# Patient Record
Sex: FEMALE | Race: WHITE
[De-identification: names, ages, dates, MRNs, and addresses within clinical notes are randomized per-mention and may not be internally consistent; named-entity substitution may affect disease eponyms.]

---

## 2020-06-28 ENCOUNTER — HOSPITAL ENCOUNTER (INPATIENT)
Dept: HOSPITAL 12 - ER | Age: 75
LOS: 4 days | Discharge: NURSING, REHAB OR LONG-TERM CARE | DRG: 388 | End: 2020-07-02
Attending: INTERNAL MEDICINE
Payer: MEDICARE

## 2020-06-28 VITALS — WEIGHT: 248.56 LBS | BODY MASS INDEX: 39.01 KG/M2 | HEIGHT: 67 IN

## 2020-06-28 DIAGNOSIS — N32.81: ICD-10-CM

## 2020-06-28 DIAGNOSIS — D64.9: ICD-10-CM

## 2020-06-28 DIAGNOSIS — K80.20: ICD-10-CM
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
## 2020-06-28 DIAGNOSIS — N18.9: ICD-10-CM

## 2020-06-28 DIAGNOSIS — K52.9: ICD-10-CM

## 2020-06-28 DIAGNOSIS — J90: ICD-10-CM

## 2020-06-28 DIAGNOSIS — R74.0: ICD-10-CM

## 2020-06-28 DIAGNOSIS — K57.30: ICD-10-CM

## 2020-06-28 DIAGNOSIS — E11.9: ICD-10-CM

## 2020-06-28 DIAGNOSIS — K56.41: Primary | ICD-10-CM

## 2020-06-28 DIAGNOSIS — E66.9: ICD-10-CM

## 2020-06-28 DIAGNOSIS — I48.91: ICD-10-CM

## 2020-06-28 DIAGNOSIS — I95.9: ICD-10-CM

## 2020-06-28 DIAGNOSIS — K52.89: ICD-10-CM

## 2020-06-28 DIAGNOSIS — Z87.891: ICD-10-CM

## 2020-06-28 DIAGNOSIS — T40.2X5A: ICD-10-CM

## 2020-06-28 DIAGNOSIS — R60.9: ICD-10-CM

## 2020-06-28 DIAGNOSIS — Z88.0: ICD-10-CM

## 2020-06-28 DIAGNOSIS — N17.0: ICD-10-CM

## 2020-06-28 DIAGNOSIS — Z96.641: ICD-10-CM

## 2020-06-28 DIAGNOSIS — D68.69: ICD-10-CM

## 2020-06-28 DIAGNOSIS — E86.1: ICD-10-CM

## 2020-06-28 DIAGNOSIS — E43: ICD-10-CM

## 2020-06-28 DIAGNOSIS — I49.3: ICD-10-CM

## 2020-06-28 DIAGNOSIS — E88.09: ICD-10-CM

## 2020-06-28 DIAGNOSIS — I12.9: ICD-10-CM

## 2020-06-28 DIAGNOSIS — E87.1: ICD-10-CM

## 2020-06-28 DIAGNOSIS — N28.1: ICD-10-CM

## 2020-06-28 LAB
BASOPHILS # BLD AUTO: 0 K/UL (ref 0–8)
BASOPHILS NFR BLD AUTO: 0.1 % (ref 0–2)
EOSINOPHIL # BLD AUTO: 0 K/UL (ref 0–0.7)
EOSINOPHIL NFR BLD AUTO: 0.1 % (ref 0–7)
HCT VFR BLD AUTO: 29.5 % (ref 31.2–41.9)
HGB BLD-MCNC: 10.1 G/DL (ref 10.9–14.3)
LYMPHOCYTES # BLD AUTO: 1.2 K/UL (ref 20–40)
LYMPHOCYTES NFR BLD AUTO: 10.8 % (ref 20.5–51.5)
MCH RBC QN AUTO: 30.5 UUG (ref 24.7–32.8)
MCHC RBC AUTO-ENTMCNC: 34 G/DL (ref 32.3–35.6)
MCV RBC AUTO: 88.5 FL (ref 75.5–95.3)
MONOCYTES # BLD AUTO: 1.1 K/UL (ref 2–10)
MONOCYTES NFR BLD AUTO: 10.2 % (ref 0–11)
NEUTROPHILS # BLD AUTO: 8.8 K/UL (ref 1.8–8.9)
NEUTROPHILS NFR BLD AUTO: 78.8 % (ref 38.5–71.5)
PLATELET # BLD AUTO: 261 K/UL (ref 179–408)
RBC # BLD AUTO: 3.33 MIL/UL (ref 3.63–4.92)
WBC # BLD AUTO: 11.1 K/UL (ref 3.8–11.8)

## 2020-06-28 PROCEDURE — C9113 INJ PANTOPRAZOLE SODIUM, VIA: HCPCS

## 2020-06-28 PROCEDURE — G0378 HOSPITAL OBSERVATION PER HR: HCPCS

## 2020-06-29 VITALS — DIASTOLIC BLOOD PRESSURE: 50 MMHG | SYSTOLIC BLOOD PRESSURE: 100 MMHG

## 2020-06-29 VITALS — SYSTOLIC BLOOD PRESSURE: 98 MMHG | DIASTOLIC BLOOD PRESSURE: 48 MMHG

## 2020-06-29 VITALS — SYSTOLIC BLOOD PRESSURE: 94 MMHG | DIASTOLIC BLOOD PRESSURE: 48 MMHG

## 2020-06-29 VITALS — DIASTOLIC BLOOD PRESSURE: 53 MMHG | SYSTOLIC BLOOD PRESSURE: 104 MMHG

## 2020-06-29 LAB
ALP SERPL-CCNC: 73 U/L (ref 50–136)
ALP SERPL-CCNC: 84 U/L (ref 50–136)
ALT SERPL W/O P-5'-P-CCNC: 27 U/L (ref 14–59)
ALT SERPL W/O P-5'-P-CCNC: 35 U/L (ref 14–59)
APPEARANCE UR: CLEAR
APPEARANCE UR: CLEAR
AST SERPL-CCNC: 29 U/L (ref 15–37)
AST SERPL-CCNC: 35 U/L (ref 15–37)
BASOPHILS # BLD AUTO: 0 K/UL (ref 0–8)
BASOPHILS NFR BLD AUTO: 0.1 % (ref 0–2)
BILIRUB DIRECT SERPL-MCNC: 0.3 MG/DL (ref 0–0.2)
BILIRUB SERPL-MCNC: 0.6 MG/DL (ref 0.2–1)
BILIRUB SERPL-MCNC: 0.9 MG/DL (ref 0.2–1)
BILIRUB UR QL STRIP: (no result)
BILIRUB UR QL STRIP: (no result)
BUN SERPL-MCNC: 40 MG/DL (ref 7–18)
BUN SERPL-MCNC: 52 MG/DL (ref 7–18)
CHLORIDE SERPL-SCNC: 100 MMOL/L (ref 98–107)
CHLORIDE SERPL-SCNC: 104 MMOL/L (ref 98–107)
CO2 SERPL-SCNC: 23 MMOL/L (ref 21–32)
CO2 SERPL-SCNC: 25 MMOL/L (ref 21–32)
COLOR UR: (no result)
CREAT SERPL-MCNC: 1 MG/DL (ref 0.6–1.3)
CREAT SERPL-MCNC: 1.7 MG/DL (ref 0.6–1.3)
CREAT UR-MCNC: 109.8 MG/DL (ref 30–125)
DEPRECATED SQUAMOUS URNS QL MICRO: (no result) /HPF
DEPRECATED SQUAMOUS URNS QL MICRO: (no result) /HPF
EOSINOPHIL # BLD AUTO: 0 K/UL (ref 0–0.7)
EOSINOPHIL NFR BLD AUTO: 0.1 % (ref 0–7)
GLUCOSE SERPL-MCNC: 124 MG/DL (ref 74–106)
GLUCOSE SERPL-MCNC: 125 MG/DL (ref 74–106)
GLUCOSE UR STRIP-MCNC: NEGATIVE MG/DL
GLUCOSE UR STRIP-MCNC: NEGATIVE MG/DL
HCT VFR BLD AUTO: 26.1 % (ref 31.2–41.9)
HGB BLD-MCNC: 8.9 G/DL (ref 10.9–14.3)
HGB UR QL STRIP: (no result)
HGB UR QL STRIP: (no result)
KETONES UR STRIP-MCNC: NEGATIVE MG/DL
KETONES UR STRIP-MCNC: NEGATIVE MG/DL
LEUKOCYTE ESTERASE UR QL STRIP: NEGATIVE
LEUKOCYTE ESTERASE UR QL STRIP: NEGATIVE
LIPASE SERPL-CCNC: 31 U/L (ref 73–393)
LYMPHOCYTES # BLD AUTO: 0.7 K/UL (ref 20–40)
LYMPHOCYTES NFR BLD AUTO: 8.7 % (ref 20.5–51.5)
MCH RBC QN AUTO: 30.3 UUG (ref 24.7–32.8)
MCHC RBC AUTO-ENTMCNC: 34 G/DL (ref 32.3–35.6)
MCV RBC AUTO: 88.6 FL (ref 75.5–95.3)
MONOCYTES # BLD AUTO: 1.1 K/UL (ref 2–10)
MONOCYTES NFR BLD AUTO: 13.4 % (ref 0–11)
NEUTROPHILS # BLD AUTO: 6.5 K/UL (ref 1.8–8.9)
NEUTROPHILS NFR BLD AUTO: 77.7 % (ref 38.5–71.5)
NITRITE UR QL STRIP: NEGATIVE
NITRITE UR QL STRIP: NEGATIVE
PH UR STRIP: 5.5 [PH] (ref 5–8)
PH UR STRIP: 6 [PH] (ref 5–8)
PLATELET # BLD AUTO: 247 K/UL (ref 179–408)
POTASSIUM SERPL-SCNC: 3 MMOL/L (ref 3.5–5.1)
POTASSIUM SERPL-SCNC: 3.6 MMOL/L (ref 3.5–5.1)
PROT UR-MCNC: 83.2 MG/DL
RBC # BLD AUTO: 2.95 MIL/UL (ref 3.63–4.92)
RBC #/AREA URNS HPF: (no result) /HPF (ref 0–3)
RBC #/AREA URNS HPF: (no result) /HPF (ref 0–3)
SP GR UR STRIP: 1.02 (ref 1–1.03)
SP GR UR STRIP: 1.02 (ref 1–1.03)
UROBILINOGEN UR STRIP-MCNC: 1 E.U./DL
UROBILINOGEN UR STRIP-MCNC: 1 E.U./DL
WBC # BLD AUTO: 8.4 K/UL (ref 3.8–11.8)
WBC #/AREA URNS HPF: (no result) /HPF
WBC #/AREA URNS HPF: (no result) /HPF (ref 0–3)
WBC #/AREA URNS HPF: (no result) /HPF (ref 0–3)
WS STN SPEC: 5 G/DL (ref 6.4–8.2)
WS STN SPEC: 5.8 G/DL (ref 6.4–8.2)

## 2020-06-29 RX ADMIN — DEXTROSE SCH MLS/HR: 50 INJECTION, SOLUTION INTRAVENOUS at 21:48

## 2020-06-29 RX ADMIN — DOCUSATE SODIUM SCH MG: 100 CAPSULE, LIQUID FILLED ORAL at 14:26

## 2020-06-29 RX ADMIN — ATORVASTATIN CALCIUM SCH MG: 40 TABLET, FILM COATED ORAL at 20:39

## 2020-06-29 RX ADMIN — DEXTROSE SCH MG: 50 INJECTION, SOLUTION INTRAVENOUS at 08:51

## 2020-06-29 RX ADMIN — ENOXAPARIN SODIUM SCH MG: 30 INJECTION SUBCUTANEOUS at 08:51

## 2020-06-29 RX ADMIN — DEXTROSE SCH MLS/HR: 50 INJECTION, SOLUTION INTRAVENOUS at 17:50

## 2020-06-29 RX ADMIN — DOCUSATE SODIUM SCH MG: 100 CAPSULE, LIQUID FILLED ORAL at 20:39

## 2020-06-29 RX ADMIN — SODIUM CHLORIDE PRN MLS/HR: 0.9 INJECTION, SOLUTION INTRAVENOUS at 14:00

## 2020-06-29 RX ADMIN — Medication SCH MG: at 14:26

## 2020-06-29 RX ADMIN — DEXTROSE SCH MLS/HR: 50 INJECTION, SOLUTION INTRAVENOUS at 20:39

## 2020-06-29 RX ADMIN — DEXTROSE SCH MLS/HR: 50 INJECTION, SOLUTION INTRAVENOUS at 15:31

## 2020-06-29 RX ADMIN — DEXTROSE SCH MLS/HR: 50 INJECTION, SOLUTION INTRAVENOUS at 16:39

## 2020-06-29 RX ADMIN — DEXTROSE SCH MLS/HR: 50 INJECTION, SOLUTION INTRAVENOUS at 14:24

## 2020-06-29 RX ADMIN — Medication PRN MG: at 05:15

## 2020-06-30 VITALS — SYSTOLIC BLOOD PRESSURE: 103 MMHG | DIASTOLIC BLOOD PRESSURE: 47 MMHG

## 2020-06-30 VITALS — SYSTOLIC BLOOD PRESSURE: 104 MMHG | DIASTOLIC BLOOD PRESSURE: 49 MMHG

## 2020-06-30 VITALS — SYSTOLIC BLOOD PRESSURE: 122 MMHG | DIASTOLIC BLOOD PRESSURE: 62 MMHG

## 2020-06-30 VITALS — DIASTOLIC BLOOD PRESSURE: 48 MMHG | SYSTOLIC BLOOD PRESSURE: 104 MMHG

## 2020-06-30 VITALS — SYSTOLIC BLOOD PRESSURE: 100 MMHG | DIASTOLIC BLOOD PRESSURE: 42 MMHG

## 2020-06-30 LAB
ALP SERPL-CCNC: 88 U/L (ref 50–136)
ALT SERPL W/O P-5'-P-CCNC: 35 U/L (ref 14–59)
AST SERPL-CCNC: 38 U/L (ref 15–37)
BASOPHILS # BLD AUTO: 0 K/UL (ref 0–8)
BASOPHILS NFR BLD AUTO: 0.1 % (ref 0–2)
BASOPHILS NFR BLD MANUAL: 1 % (ref 0–2)
BILIRUB SERPL-MCNC: 0.5 MG/DL (ref 0.2–1)
BUN SERPL-MCNC: 28 MG/DL (ref 7–18)
CHLORIDE SERPL-SCNC: 107 MMOL/L (ref 98–107)
CHOLEST SERPL-MCNC: 73 MG/DL (ref ?–200)
CK SERPL-CCNC: 55 U/L (ref 26–192)
CO2 SERPL-SCNC: 25 MMOL/L (ref 21–32)
CREAT SERPL-MCNC: 0.8 MG/DL (ref 0.6–1.3)
EOSINOPHIL # BLD AUTO: 0.1 K/UL (ref 0–0.7)
EOSINOPHIL NFR BLD AUTO: 0.6 % (ref 0–7)
EOSINOPHIL NFR BLD MANUAL: 1 % (ref 0–8)
EOSINOPHIL NFR BLD MANUAL: 1 % (ref 0–8)
GLUCOSE SERPL-MCNC: 108 MG/DL (ref 74–106)
HCT VFR BLD AUTO: 23.8 % (ref 31.2–41.9)
HDLC SERPL-MCNC: < 10 MG/DL (ref 40–60)
HGB BLD-MCNC: 8.3 G/DL (ref 10.9–14.3)
LYMPHOCYTES # BLD AUTO: 1.1 K/UL (ref 20–40)
LYMPHOCYTES NFR BLD AUTO: 10.2 % (ref 20.5–51.5)
LYMPHOCYTES NFR BLD MANUAL: 18 % (ref 20–40)
LYMPHOCYTES NFR BLD MANUAL: 9 % (ref 20–40)
MAGNESIUM SERPL-MCNC: 2.5 MG/DL (ref 1.8–2.4)
MCH RBC QN AUTO: 30.5 UUG (ref 24.7–32.8)
MCHC RBC AUTO-ENTMCNC: 35 G/DL (ref 32.3–35.6)
MCV RBC AUTO: 87.8 FL (ref 75.5–95.3)
METAMYELOCYTES NFR BLD MANUAL: 1 % (ref 0–1)
METAMYELOCYTES NFR BLD MANUAL: 2 % (ref 0–1)
MONOCYTES # BLD AUTO: 1.2 K/UL (ref 2–10)
MONOCYTES NFR BLD AUTO: 11.4 % (ref 0–11)
MONOCYTES NFR BLD MANUAL: 18 % (ref 2–10)
MONOCYTES NFR BLD MANUAL: 7 % (ref 2–10)
NEUTROPHILS # BLD AUTO: 8.2 K/UL (ref 1.8–8.9)
NEUTROPHILS NFR BLD AUTO: 77.7 % (ref 38.5–71.5)
NEUTS BAND NFR BLD MANUAL: 21 % (ref 0–10)
NEUTS BAND NFR BLD MANUAL: 8 % (ref 0–10)
NEUTS SEG NFR BLD MANUAL: 49 % (ref 42–75)
NEUTS SEG NFR BLD MANUAL: 64 % (ref 42–75)
PHOSPHATE SERPL-MCNC: 1.7 MG/DL (ref 2.5–4.9)
PLATELET # BLD AUTO: 243 K/UL (ref 179–408)
POTASSIUM SERPL-SCNC: 3.4 MMOL/L (ref 3.5–5.1)
RBC # BLD AUTO: 2.71 MIL/UL (ref 3.63–4.92)
TRIGL SERPL-MCNC: 151 MG/DL (ref 30–150)
TSH SERPL DL<=0.005 MIU/L-ACNC: 1.49 MIU/ML (ref 0.36–3.74)
WBC # BLD AUTO: 10.6 K/UL (ref 3.8–11.8)
WS STN SPEC: 4.7 G/DL (ref 6.4–8.2)

## 2020-06-30 RX ADMIN — DEXTROSE SCH MLS/HR: 50 INJECTION, SOLUTION INTRAVENOUS at 10:58

## 2020-06-30 RX ADMIN — SODIUM CHLORIDE PRN MLS/HR: 0.9 INJECTION, SOLUTION INTRAVENOUS at 12:11

## 2020-06-30 RX ADMIN — Medication PRN MG: at 04:56

## 2020-06-30 RX ADMIN — BISACODYL SCH MG: 10 SUPPOSITORY RECTAL at 20:23

## 2020-06-30 RX ADMIN — Medication SCH MG: at 20:23

## 2020-06-30 RX ADMIN — Medication SCH MG: at 08:34

## 2020-06-30 RX ADMIN — DOCUSATE SODIUM SCH MG: 100 CAPSULE, LIQUID FILLED ORAL at 20:23

## 2020-06-30 RX ADMIN — Medication PRN MG: at 00:34

## 2020-06-30 RX ADMIN — DEXTROSE SCH MLS/HR: 50 INJECTION, SOLUTION INTRAVENOUS at 12:11

## 2020-06-30 RX ADMIN — ENOXAPARIN SODIUM SCH MG: 30 INJECTION SUBCUTANEOUS at 08:38

## 2020-06-30 RX ADMIN — METOCLOPRAMIDE SCH MG: 5 INJECTION, SOLUTION INTRAMUSCULAR; INTRAVENOUS at 20:25

## 2020-06-30 RX ADMIN — ATORVASTATIN CALCIUM SCH MG: 40 TABLET, FILM COATED ORAL at 20:23

## 2020-06-30 RX ADMIN — DOCUSATE SODIUM SCH MG: 100 CAPSULE, LIQUID FILLED ORAL at 08:34

## 2020-06-30 RX ADMIN — DEXTROSE SCH MLS/HR: 50 INJECTION, SOLUTION INTRAVENOUS at 09:49

## 2020-06-30 RX ADMIN — DEXTROSE SCH MLS/HR: 50 INJECTION, SOLUTION INTRAVENOUS at 13:22

## 2020-06-30 RX ADMIN — DEXTROSE SCH MG: 50 INJECTION, SOLUTION INTRAVENOUS at 08:33

## 2020-07-01 VITALS — SYSTOLIC BLOOD PRESSURE: 101 MMHG | DIASTOLIC BLOOD PRESSURE: 78 MMHG

## 2020-07-01 VITALS — SYSTOLIC BLOOD PRESSURE: 139 MMHG | DIASTOLIC BLOOD PRESSURE: 49 MMHG

## 2020-07-01 VITALS — DIASTOLIC BLOOD PRESSURE: 60 MMHG | SYSTOLIC BLOOD PRESSURE: 136 MMHG

## 2020-07-01 VITALS — DIASTOLIC BLOOD PRESSURE: 66 MMHG | SYSTOLIC BLOOD PRESSURE: 137 MMHG

## 2020-07-01 VITALS — DIASTOLIC BLOOD PRESSURE: 63 MMHG | SYSTOLIC BLOOD PRESSURE: 132 MMHG

## 2020-07-01 LAB
ALBUMIN SERPL ELPH-MCNC: 1.6 G/DL (ref 2.9–4.4)
ALBUMIN/GLOB SERPL: 0.7 {RATIO} (ref 0.7–1.7)
ALP SERPL-CCNC: 102 U/L (ref 50–136)
ALPHA1 GLOB SERPL ELPH-MCNC: 0.5 G/DL (ref 0–0.4)
ALPHA2 GLOB SERPL ELPH-MCNC: 0.9 G/DL (ref 0.4–1)
ALT SERPL W/O P-5'-P-CCNC: 112 U/L (ref 14–59)
AST SERPL-CCNC: 116 U/L (ref 15–37)
B-GLOBULIN SERPL ELPH-MCNC: 0.6 G/DL (ref 0.7–1.3)
BASOPHILS # BLD AUTO: 0 K/UL (ref 0–8)
BASOPHILS NFR BLD AUTO: 0.2 % (ref 0–2)
BILIRUB SERPL-MCNC: 0.5 MG/DL (ref 0.2–1)
BUN SERPL-MCNC: 15 MG/DL (ref 7–18)
CHLORIDE SERPL-SCNC: 108 MMOL/L (ref 98–107)
CO2 SERPL-SCNC: 25 MMOL/L (ref 21–32)
CREAT SERPL-MCNC: 0.6 MG/DL (ref 0.6–1.3)
EOSINOPHIL # BLD AUTO: 0 K/UL (ref 0–0.7)
EOSINOPHIL NFR BLD AUTO: 0.4 % (ref 0–7)
GAMMA GLOB SERPL ELPH-MCNC: 0.3 G/DL (ref 0.4–1.8)
GLOBULIN SER CALC-MCNC: 2.3 G/DL (ref 2.2–3.9)
GLUCOSE SERPL-MCNC: 94 MG/DL (ref 74–106)
HCT VFR BLD AUTO: 23.7 % (ref 31.2–41.9)
HGB BLD-MCNC: 8.3 G/DL (ref 10.9–14.3)
LYMPHOCYTES # BLD AUTO: 1.2 K/UL (ref 20–40)
LYMPHOCYTES NFR BLD AUTO: 11.8 % (ref 20.5–51.5)
LYMPHOCYTES NFR BLD MANUAL: 14 % (ref 20–40)
M PROTEIN SERPL ELPH-MCNC: 0.1 G/DL
MAGNESIUM SERPL-MCNC: 2.1 MG/DL (ref 1.8–2.4)
MCH RBC QN AUTO: 31 UUG (ref 24.7–32.8)
MCHC RBC AUTO-ENTMCNC: 35 G/DL (ref 32.3–35.6)
MCV RBC AUTO: 88 FL (ref 75.5–95.3)
MONOCYTES # BLD AUTO: 1.1 K/UL (ref 2–10)
MONOCYTES NFR BLD AUTO: 10.3 % (ref 0–11)
MONOCYTES NFR BLD MANUAL: 5 % (ref 2–10)
NEUTROPHILS # BLD AUTO: 8 K/UL (ref 1.8–8.9)
NEUTROPHILS NFR BLD AUTO: 77.3 % (ref 38.5–71.5)
NEUTS BAND NFR BLD MANUAL: 9 % (ref 0–10)
NEUTS SEG NFR BLD MANUAL: 72 % (ref 42–75)
PHOSPHATE SERPL-MCNC: 2.6 MG/DL (ref 2.5–4.9)
PLATELET # BLD AUTO: 277 K/UL (ref 179–408)
POTASSIUM SERPL-SCNC: 3.6 MMOL/L (ref 3.5–5.1)
RBC # BLD AUTO: 2.69 MIL/UL (ref 3.63–4.92)
WBC # BLD AUTO: 10.4 K/UL (ref 3.8–11.8)
WS STN SPEC: 4.8 G/DL (ref 6.4–8.2)

## 2020-07-01 RX ADMIN — DEXTROSE SCH MLS/HR: 50 INJECTION, SOLUTION INTRAVENOUS at 13:31

## 2020-07-01 RX ADMIN — DOCUSATE SODIUM SCH MG: 100 CAPSULE, LIQUID FILLED ORAL at 08:33

## 2020-07-01 RX ADMIN — Medication SCH MG: at 08:33

## 2020-07-01 RX ADMIN — DEXTROSE SCH MLS/HR: 50 INJECTION, SOLUTION INTRAVENOUS at 10:41

## 2020-07-01 RX ADMIN — Medication SCH MG: at 17:43

## 2020-07-01 RX ADMIN — DOCUSATE SODIUM SCH MG: 100 CAPSULE, LIQUID FILLED ORAL at 20:26

## 2020-07-01 RX ADMIN — Medication SCH MG: at 00:35

## 2020-07-01 RX ADMIN — Medication SCH MG: at 12:30

## 2020-07-01 RX ADMIN — METOCLOPRAMIDE SCH MG: 5 INJECTION, SOLUTION INTRAMUSCULAR; INTRAVENOUS at 05:55

## 2020-07-01 RX ADMIN — BISACODYL SCH MG: 10 SUPPOSITORY RECTAL at 20:26

## 2020-07-01 RX ADMIN — ENOXAPARIN SODIUM SCH MG: 30 INJECTION SUBCUTANEOUS at 08:39

## 2020-07-01 RX ADMIN — METOCLOPRAMIDE SCH MG: 5 INJECTION, SOLUTION INTRAMUSCULAR; INTRAVENOUS at 12:30

## 2020-07-01 RX ADMIN — DEXTROSE SCH MLS/HR: 50 INJECTION, SOLUTION INTRAVENOUS at 14:41

## 2020-07-01 RX ADMIN — BISACODYL SCH MG: 10 SUPPOSITORY RECTAL at 08:33

## 2020-07-01 RX ADMIN — METOCLOPRAMIDE SCH MG: 5 INJECTION, SOLUTION INTRAMUSCULAR; INTRAVENOUS at 00:34

## 2020-07-01 RX ADMIN — DEXTROSE SCH MLS/HR: 50 INJECTION, SOLUTION INTRAVENOUS at 12:29

## 2020-07-01 RX ADMIN — DEXTROSE SCH MG: 50 INJECTION, SOLUTION INTRAVENOUS at 08:33

## 2020-07-01 RX ADMIN — KETOROLAC TROMETHAMINE PRN MG: 15 INJECTION, SOLUTION INTRAMUSCULAR; INTRAVENOUS at 03:14

## 2020-07-01 RX ADMIN — Medication SCH MG: at 05:55

## 2020-07-01 RX ADMIN — METOCLOPRAMIDE SCH MG: 5 INJECTION, SOLUTION INTRAMUSCULAR; INTRAVENOUS at 17:43

## 2020-07-01 RX ADMIN — SODIUM CHLORIDE PRN MLS/HR: 0.9 INJECTION, SOLUTION INTRAVENOUS at 04:03

## 2020-07-02 ENCOUNTER — HOSPITAL ENCOUNTER (INPATIENT)
Dept: HOSPITAL 12 - REHABOV3 | Age: 75
LOS: 8 days | Discharge: LEFT BEFORE DISCHARGE | DRG: 559 | End: 2020-07-10
Attending: PHYSICAL MEDICINE & REHABILITATION | Admitting: PHYSICAL MEDICINE & REHABILITATION
Payer: MEDICARE

## 2020-07-02 VITALS — DIASTOLIC BLOOD PRESSURE: 60 MMHG | SYSTOLIC BLOOD PRESSURE: 144 MMHG

## 2020-07-02 VITALS — DIASTOLIC BLOOD PRESSURE: 70 MMHG | SYSTOLIC BLOOD PRESSURE: 147 MMHG

## 2020-07-02 VITALS — SYSTOLIC BLOOD PRESSURE: 155 MMHG | DIASTOLIC BLOOD PRESSURE: 75 MMHG

## 2020-07-02 VITALS — SYSTOLIC BLOOD PRESSURE: 152 MMHG | DIASTOLIC BLOOD PRESSURE: 71 MMHG

## 2020-07-02 DIAGNOSIS — E66.9: ICD-10-CM

## 2020-07-02 DIAGNOSIS — E43: ICD-10-CM

## 2020-07-02 DIAGNOSIS — T40.2X5D: ICD-10-CM

## 2020-07-02 DIAGNOSIS — I11.9: ICD-10-CM

## 2020-07-02 DIAGNOSIS — D64.9: ICD-10-CM

## 2020-07-02 DIAGNOSIS — N17.9: ICD-10-CM

## 2020-07-02 DIAGNOSIS — Z47.1: Primary | ICD-10-CM

## 2020-07-02 DIAGNOSIS — E11.65: ICD-10-CM

## 2020-07-02 DIAGNOSIS — I25.2: ICD-10-CM

## 2020-07-02 DIAGNOSIS — Z87.891: ICD-10-CM

## 2020-07-02 DIAGNOSIS — N32.81: ICD-10-CM

## 2020-07-02 DIAGNOSIS — Z85.43: ICD-10-CM

## 2020-07-02 DIAGNOSIS — I48.91: ICD-10-CM

## 2020-07-02 DIAGNOSIS — H26.9: ICD-10-CM

## 2020-07-02 DIAGNOSIS — N28.1: ICD-10-CM

## 2020-07-02 DIAGNOSIS — K52.9: ICD-10-CM

## 2020-07-02 DIAGNOSIS — Z88.0: ICD-10-CM

## 2020-07-02 DIAGNOSIS — E78.5: ICD-10-CM

## 2020-07-02 DIAGNOSIS — K80.20: ICD-10-CM

## 2020-07-02 DIAGNOSIS — D68.59: ICD-10-CM

## 2020-07-02 DIAGNOSIS — K57.30: ICD-10-CM

## 2020-07-02 DIAGNOSIS — Z96.641: ICD-10-CM

## 2020-07-02 DIAGNOSIS — K59.03: ICD-10-CM

## 2020-07-02 DIAGNOSIS — R26.9: ICD-10-CM

## 2020-07-02 LAB
ALP SERPL-CCNC: 92 U/L (ref 50–136)
ALT SERPL W/O P-5'-P-CCNC: 136 U/L (ref 14–59)
AST SERPL-CCNC: 105 U/L (ref 15–37)
BASOPHILS # BLD AUTO: 0 K/UL (ref 0–8)
BASOPHILS NFR BLD AUTO: 0.1 % (ref 0–2)
BILIRUB SERPL-MCNC: 0.7 MG/DL (ref 0.2–1)
BUN SERPL-MCNC: 14 MG/DL (ref 7–18)
CHLORIDE SERPL-SCNC: 109 MMOL/L (ref 98–107)
CO2 SERPL-SCNC: 25 MMOL/L (ref 21–32)
CREAT SERPL-MCNC: 0.6 MG/DL (ref 0.6–1.3)
EOSINOPHIL # BLD AUTO: 0.1 K/UL (ref 0–0.7)
EOSINOPHIL NFR BLD AUTO: 1.1 % (ref 0–7)
GLUCOSE SERPL-MCNC: 91 MG/DL (ref 74–106)
HCT VFR BLD AUTO: 23.7 % (ref 31.2–41.9)
HGB BLD-MCNC: 8.2 G/DL (ref 10.9–14.3)
LYMPHOCYTES # BLD AUTO: 1.6 K/UL (ref 20–40)
LYMPHOCYTES NFR BLD AUTO: 18.4 % (ref 20.5–51.5)
MAGNESIUM SERPL-MCNC: 2 MG/DL (ref 1.8–2.4)
MCH RBC QN AUTO: 30.5 UUG (ref 24.7–32.8)
MCHC RBC AUTO-ENTMCNC: 35 G/DL (ref 32.3–35.6)
MCV RBC AUTO: 88.2 FL (ref 75.5–95.3)
MONOCYTES # BLD AUTO: 1.1 K/UL (ref 2–10)
MONOCYTES NFR BLD AUTO: 12.5 % (ref 0–11)
NEUTROPHILS # BLD AUTO: 6 K/UL (ref 1.8–8.9)
NEUTROPHILS NFR BLD AUTO: 67.9 % (ref 38.5–71.5)
PHOSPHATE SERPL-MCNC: 2.7 MG/DL (ref 2.5–4.9)
PLATELET # BLD AUTO: 313 K/UL (ref 179–408)
POTASSIUM SERPL-SCNC: 3.9 MMOL/L (ref 3.5–5.1)
RBC # BLD AUTO: 2.69 MIL/UL (ref 3.63–4.92)
WBC # BLD AUTO: 8.8 K/UL (ref 3.8–11.8)
WS STN SPEC: 5 G/DL (ref 6.4–8.2)

## 2020-07-02 RX ADMIN — KETOROLAC TROMETHAMINE PRN MG: 15 INJECTION, SOLUTION INTRAMUSCULAR; INTRAVENOUS at 00:28

## 2020-07-02 RX ADMIN — Medication SCH MG: at 00:28

## 2020-07-02 RX ADMIN — METOCLOPRAMIDE SCH MG: 5 INJECTION, SOLUTION INTRAMUSCULAR; INTRAVENOUS at 00:28

## 2020-07-02 RX ADMIN — Medication SCH MG: at 23:17

## 2020-07-02 RX ADMIN — METOCLOPRAMIDE SCH MG: 5 INJECTION, SOLUTION INTRAMUSCULAR; INTRAVENOUS at 12:32

## 2020-07-02 RX ADMIN — ENOXAPARIN SODIUM SCH MG: 30 INJECTION SUBCUTANEOUS at 08:50

## 2020-07-02 RX ADMIN — BISACODYL SCH MG: 10 SUPPOSITORY RECTAL at 08:47

## 2020-07-02 RX ADMIN — DOCUSATE SODIUM SCH MG: 100 CAPSULE, LIQUID FILLED ORAL at 21:00

## 2020-07-02 RX ADMIN — Medication SCH MG: at 06:12

## 2020-07-02 RX ADMIN — Medication SCH MG: at 12:59

## 2020-07-02 RX ADMIN — Medication SCH MG: at 08:50

## 2020-07-02 RX ADMIN — METOCLOPRAMIDE SCH MG: 5 INJECTION, SOLUTION INTRAMUSCULAR; INTRAVENOUS at 06:12

## 2020-07-02 RX ADMIN — DOCUSATE SODIUM SCH MG: 100 CAPSULE, LIQUID FILLED ORAL at 08:47

## 2020-07-02 NOTE — NUR
Received bedside admission report from JOAO Bunch ,patient in bed, awake, busy talking on her 
cell phone. Presented complaint of mild abdominal discomforts due to abdominal cramps. 
Routine admission care done. Plan of care initiated.

## 2020-07-03 VITALS — SYSTOLIC BLOOD PRESSURE: 142 MMHG | DIASTOLIC BLOOD PRESSURE: 66 MMHG

## 2020-07-03 VITALS — SYSTOLIC BLOOD PRESSURE: 120 MMHG | DIASTOLIC BLOOD PRESSURE: 70 MMHG

## 2020-07-03 VITALS — SYSTOLIC BLOOD PRESSURE: 138 MMHG | DIASTOLIC BLOOD PRESSURE: 69 MMHG

## 2020-07-03 VITALS — SYSTOLIC BLOOD PRESSURE: 158 MMHG | DIASTOLIC BLOOD PRESSURE: 79 MMHG

## 2020-07-03 RX ADMIN — POLYETHYLENE GLYCOL 3350 SCH GM: 17 POWDER, FOR SOLUTION ORAL at 09:00

## 2020-07-03 RX ADMIN — DOCUSATE SODIUM SCH MG: 100 CAPSULE, LIQUID FILLED ORAL at 08:49

## 2020-07-03 RX ADMIN — Medication SCH MG: at 08:48

## 2020-07-03 RX ADMIN — Medication SCH MG: at 06:00

## 2020-07-03 RX ADMIN — CELECOXIB SCH MG: 100 CAPSULE ORAL at 16:27

## 2020-07-03 RX ADMIN — POLYETHYLENE GLYCOL 3350 SCH GM: 17 POWDER, FOR SOLUTION ORAL at 08:49

## 2020-07-03 RX ADMIN — DOCUSATE SODIUM SCH MG: 100 CAPSULE, LIQUID FILLED ORAL at 09:00

## 2020-07-03 RX ADMIN — Medication SCH MG: at 16:28

## 2020-07-03 RX ADMIN — Medication SCH MG: at 12:09

## 2020-07-03 RX ADMIN — DOCUSATE SODIUM SCH MG: 100 CAPSULE, LIQUID FILLED ORAL at 21:00

## 2020-07-03 RX ADMIN — Medication SCH MG: at 00:00

## 2020-07-03 RX ADMIN — LACTOBACILLUS ACIDOPH-L.BULGARICUS 1 MILLION CELL CHEWABLE TABLET SCH TAB.CHEW: at 08:48

## 2020-07-03 RX ADMIN — Medication SCH MG: at 08:49

## 2020-07-03 RX ADMIN — ENOXAPARIN SODIUM SCH MG: 30 INJECTION SUBCUTANEOUS at 08:51

## 2020-07-03 RX ADMIN — THERA TABS SCH UDTAB: TAB at 08:48

## 2020-07-03 RX ADMIN — LACTOBACILLUS ACIDOPH-L.BULGARICUS 1 MILLION CELL CHEWABLE TABLET SCH TAB.CHEW: at 16:27

## 2020-07-03 RX ADMIN — CELECOXIB SCH MG: 100 CAPSULE ORAL at 08:49

## 2020-07-03 RX ADMIN — PANTOPRAZOLE SODIUM SCH MG: 40 TABLET, DELAYED RELEASE ORAL at 06:09

## 2020-07-03 RX ADMIN — Medication SCH MG: at 12:08

## 2020-07-03 RX ADMIN — DILTIAZEM HYDROCHLORIDE SCH MG: 180 CAPSULE, EXTENDED RELEASE ORAL at 08:48

## 2020-07-03 NOTE — NUR
PATIENT ALERT ORIENTED, NO SOB NO CHEST PAIN. PATIENT HAS NO COMPLAIN OF PAIN AT THIS TIME. 
PATIENT JOHNSON CATH PATENT DRAINING WITH YELLOW COLOR URINE IN MODERATE AMOUNT, KEPT CLEAN 
AND DRY. CALL LIGHT WITHIN REACH.

## 2020-07-03 NOTE — NUR
Refused antibiotic liquid at this time. She prefers pills not liquid that makes her throw 
up. Will endorse to oncoming nurse.

## 2020-07-03 NOTE — NUR
spoke to Afua Og from [facility], per Afua, discontinue the wound 
vac, and remove the staples on the july 9th, per Afua this is the standing order from 
her surgeon. wound vac removed, no drainage noted, staples are intact, noted only with 
discolored skin to the right hip, around the incision site as well, however incision line is 
dry and clean, no drainage, no odor, no increased erythema noted,

## 2020-07-03 NOTE — NUR
PATIENT ALERT, ORIENTED X4, NO SOB, RESP EVEN NONLABORD, SKIN WARM AND DRY TO TOUCH, 
ASSISTED WITH ADLS, NO DISTRESS NOTED

## 2020-07-03 NOTE — NUR
Shift End Report: VS stable. medicated once for pain with relief. Able to sleep good after 
that. Patient refused to have a nasal swab and wound picture done. Quite upset and had 
attitude since last night that she's trying to limit conversation with. Had twice loose BM 
moderate amount the start of shift. Good melo care and skin care rendered. All needs 
attended and met. Continue rehab plan of care.

## 2020-07-03 NOTE — NUR
Assisted to BSC with 2 people assist. Had loose BM moderate amount. Finally agreed on nasal 
swabbing and picture taking. Wound vacuum working but no output noted.

## 2020-07-04 VITALS — SYSTOLIC BLOOD PRESSURE: 139 MMHG | DIASTOLIC BLOOD PRESSURE: 69 MMHG

## 2020-07-04 VITALS — SYSTOLIC BLOOD PRESSURE: 144 MMHG | DIASTOLIC BLOOD PRESSURE: 69 MMHG

## 2020-07-04 VITALS — DIASTOLIC BLOOD PRESSURE: 66 MMHG | SYSTOLIC BLOOD PRESSURE: 130 MMHG

## 2020-07-04 VITALS — SYSTOLIC BLOOD PRESSURE: 130 MMHG | DIASTOLIC BLOOD PRESSURE: 67 MMHG

## 2020-07-04 LAB
ALP SERPL-CCNC: 77 U/L (ref 50–136)
ALT SERPL W/O P-5'-P-CCNC: 94 U/L (ref 14–59)
AST SERPL-CCNC: 47 U/L (ref 15–37)
BASOPHILS # BLD AUTO: 0 K/UL (ref 0–8)
BASOPHILS NFR BLD AUTO: 0.1 % (ref 0–2)
BILIRUB SERPL-MCNC: 0.6 MG/DL (ref 0.2–1)
BUN SERPL-MCNC: 12 MG/DL (ref 7–18)
CHLORIDE SERPL-SCNC: 109 MMOL/L (ref 98–107)
CO2 SERPL-SCNC: 27 MMOL/L (ref 21–32)
CREAT SERPL-MCNC: 0.6 MG/DL (ref 0.6–1.3)
EOSINOPHIL # BLD AUTO: 0.1 K/UL (ref 0–0.7)
EOSINOPHIL NFR BLD AUTO: 1 % (ref 0–7)
GLUCOSE SERPL-MCNC: 87 MG/DL (ref 74–106)
HCT VFR BLD AUTO: 25.5 % (ref 31.2–41.9)
HGB BLD-MCNC: 8.7 G/DL (ref 10.9–14.3)
LYMPHOCYTES # BLD AUTO: 1.8 K/UL (ref 20–40)
LYMPHOCYTES NFR BLD AUTO: 20.8 % (ref 20.5–51.5)
MAGNESIUM SERPL-MCNC: 1.7 MG/DL (ref 1.8–2.4)
MCH RBC QN AUTO: 30.1 UUG (ref 24.7–32.8)
MCHC RBC AUTO-ENTMCNC: 34 G/DL (ref 32.3–35.6)
MCV RBC AUTO: 88.5 FL (ref 75.5–95.3)
MONOCYTES # BLD AUTO: 0.8 K/UL (ref 2–10)
MONOCYTES NFR BLD AUTO: 9.3 % (ref 0–11)
NEUTROPHILS # BLD AUTO: 6 K/UL (ref 1.8–8.9)
NEUTROPHILS NFR BLD AUTO: 68.8 % (ref 38.5–71.5)
PHOSPHATE SERPL-MCNC: 2.6 MG/DL (ref 2.5–4.9)
PLATELET # BLD AUTO: 396 K/UL (ref 179–408)
POTASSIUM SERPL-SCNC: 3.9 MMOL/L (ref 3.5–5.1)
RBC # BLD AUTO: 2.88 MIL/UL (ref 3.63–4.92)
WBC # BLD AUTO: 8.7 K/UL (ref 3.8–11.8)
WS STN SPEC: 5 G/DL (ref 6.4–8.2)

## 2020-07-04 RX ADMIN — CELECOXIB SCH MG: 100 CAPSULE ORAL at 08:59

## 2020-07-04 RX ADMIN — CELECOXIB SCH MG: 100 CAPSULE ORAL at 17:06

## 2020-07-04 RX ADMIN — Medication SCH MG: at 08:59

## 2020-07-04 RX ADMIN — DOCUSATE SODIUM SCH MG: 100 CAPSULE, LIQUID FILLED ORAL at 08:59

## 2020-07-04 RX ADMIN — Medication SCH MG: at 12:29

## 2020-07-04 RX ADMIN — ENOXAPARIN SODIUM SCH MG: 30 INJECTION SUBCUTANEOUS at 09:01

## 2020-07-04 RX ADMIN — THERA TABS SCH UDTAB: TAB at 08:59

## 2020-07-04 RX ADMIN — LACTOBACILLUS ACIDOPH-L.BULGARICUS 1 MILLION CELL CHEWABLE TABLET SCH TAB.CHEW: at 08:59

## 2020-07-04 RX ADMIN — LACTOBACILLUS ACIDOPH-L.BULGARICUS 1 MILLION CELL CHEWABLE TABLET SCH TAB.CHEW: at 17:05

## 2020-07-04 RX ADMIN — PANTOPRAZOLE SODIUM SCH MG: 40 TABLET, DELAYED RELEASE ORAL at 07:04

## 2020-07-04 RX ADMIN — DILTIAZEM HYDROCHLORIDE SCH MG: 180 CAPSULE, EXTENDED RELEASE ORAL at 08:59

## 2020-07-04 RX ADMIN — POLYETHYLENE GLYCOL 3350 SCH GM: 17 POWDER, FOR SOLUTION ORAL at 09:00

## 2020-07-04 RX ADMIN — Medication SCH MG: at 17:06

## 2020-07-04 RX ADMIN — DOCUSATE SODIUM SCH MG: 100 CAPSULE, LIQUID FILLED ORAL at 20:11

## 2020-07-04 NOTE — NUR
PATIENT NOTED SHE REMOVED HER OXYGEN VIA NASAL CANNULA, PATIENT REFUSED OXYGEN, AND REFUSED 
DVT SLEEVES TO BE PUT ON. EXPLAINED TO THE PATIENT THE RISK AND BENEFIT OF DVT SLEEVES, AND 
THE OXYGEN, STILL REFUSED. PATIENT OXYGEN SAT 96% AT ROOM AIR, CONT TO MONITOR.

## 2020-07-04 NOTE — NUR
PATIENT DID NOT VOID, STATUS POST JOHNSON CATHETER , PER JAYRO AMARAL IF BLADDER SCAN GETS 600, 
INSERT JOHNSON CATHETER, NO BLADDER DISTENTION, OR DISCOMFORT NOTED, WILL ENDORSE ACCORDINGLY

-------------------------------------------------------------------------------

Addendum: 07/04/20 at 1855 by GRIS LUNDBERG RN, RN

-------------------------------------------------------------------------------

STATUS POST JOHNSON CATHETER DISCONTINUED

## 2020-07-04 NOTE — NUR
Patient is  alert,oriented x4, verbally responsive,no sob, resp even nonlabored,skin warm 
and dry to touch, patient is able to ambulate with fww  with supervision, able to transfer 
herself to the bedside commode, and to the chair with standby assistant.incision site is  
clean and dry, well approximated, no signs and symptoms  of infection noted, staples intact, 
however right hip  and thigh has bluish colored discoloration which  is not measurable, 
denny cather removed, will continue to monitor for voiding, no distress noted. patient is 
participating in PT, OT services, tolerating well._

## 2020-07-04 NOTE — NUR
PATIENT ALERT ORIENTED, NO SOB NO CHEST PAIN, PATIENT HAS SOME DISCOMFORT BUT REFUSED TO 
TAKE PAIN MEDICATIONS. PATIENT UNABLE TO SLEEP THRU THE NIGHT BUT REFUSED TO TAKE ANYTHING. 
PATIENT ASSISTED TO COMMODE FOR BOWEL ELIMINATIONS X 3 WITH SOFT SMALL BM. PATIENT JOHNSON 
CATH DRAINING WITH YELLOW COLOR URINE IN MODERATE AMOUNT. CONT TO MONITOR.

## 2020-07-05 VITALS — SYSTOLIC BLOOD PRESSURE: 134 MMHG | DIASTOLIC BLOOD PRESSURE: 64 MMHG

## 2020-07-05 VITALS — DIASTOLIC BLOOD PRESSURE: 64 MMHG | SYSTOLIC BLOOD PRESSURE: 128 MMHG

## 2020-07-05 VITALS — SYSTOLIC BLOOD PRESSURE: 137 MMHG | DIASTOLIC BLOOD PRESSURE: 63 MMHG

## 2020-07-05 VITALS — DIASTOLIC BLOOD PRESSURE: 58 MMHG | SYSTOLIC BLOOD PRESSURE: 143 MMHG

## 2020-07-05 LAB
AMORPH PHOS CRY URNS QL MICRO: (no result) /HPF
APPEARANCE UR: (no result)
BILIRUB UR QL STRIP: (no result)
COLOR UR: (no result)
DEPRECATED SQUAMOUS URNS QL MICRO: (no result) /HPF
GLUCOSE UR STRIP-MCNC: NEGATIVE MG/DL
HGB UR QL STRIP: (no result)
KETONES UR STRIP-MCNC: NEGATIVE MG/DL
LEUKOCYTE ESTERASE UR QL STRIP: (no result)
NITRITE UR QL STRIP: POSITIVE
PH UR STRIP: 8.5 [PH] (ref 5–8)
RBC #/AREA URNS HPF: (no result) /HPF (ref 0–3)
SP GR UR STRIP: 1.01 (ref 1–1.03)
TRI-PHOS CRY URNS QL MICRO: (no result) /HPF
UROBILINOGEN UR STRIP-MCNC: 4 E.U./DL
WBC #/AREA URNS HPF: (no result) /HPF
WBC #/AREA URNS HPF: (no result) /HPF (ref 0–3)

## 2020-07-05 RX ADMIN — LACTOBACILLUS ACIDOPH-L.BULGARICUS 1 MILLION CELL CHEWABLE TABLET SCH TAB.CHEW: at 09:06

## 2020-07-05 RX ADMIN — CELECOXIB SCH MG: 100 CAPSULE ORAL at 16:33

## 2020-07-05 RX ADMIN — LACTOBACILLUS ACIDOPH-L.BULGARICUS 1 MILLION CELL CHEWABLE TABLET SCH TAB.CHEW: at 16:33

## 2020-07-05 RX ADMIN — PANTOPRAZOLE SODIUM SCH MG: 40 TABLET, DELAYED RELEASE ORAL at 06:05

## 2020-07-05 RX ADMIN — DILTIAZEM HYDROCHLORIDE SCH MG: 180 CAPSULE, EXTENDED RELEASE ORAL at 09:04

## 2020-07-05 RX ADMIN — Medication SCH MG: at 09:05

## 2020-07-05 RX ADMIN — Medication SCH MG: at 13:25

## 2020-07-05 RX ADMIN — DOCUSATE SODIUM SCH MG: 100 CAPSULE, LIQUID FILLED ORAL at 09:00

## 2020-07-05 RX ADMIN — DOCUSATE SODIUM SCH MG: 100 CAPSULE, LIQUID FILLED ORAL at 21:00

## 2020-07-05 RX ADMIN — SULFAMETHOXAZOLE AND TRIMETHOPRIM SCH TAB: 800; 160 TABLET ORAL at 16:33

## 2020-07-05 RX ADMIN — TEMAZEPAM PRN MG: 15 CAPSULE ORAL at 23:55

## 2020-07-05 RX ADMIN — Medication SCH MG: at 16:34

## 2020-07-05 RX ADMIN — POLYETHYLENE GLYCOL 3350 SCH GM: 17 POWDER, FOR SOLUTION ORAL at 09:00

## 2020-07-05 RX ADMIN — Medication SCH MG: at 09:07

## 2020-07-05 RX ADMIN — THERA TABS SCH UDTAB: TAB at 09:07

## 2020-07-05 RX ADMIN — CELECOXIB SCH MG: 100 CAPSULE ORAL at 09:04

## 2020-07-05 RX ADMIN — ENOXAPARIN SODIUM SCH MG: 30 INJECTION SUBCUTANEOUS at 09:12

## 2020-07-05 NOTE — NUR
patient denied bladder discomfort, or pain upon palpation, voiding constantly moderate 
amount, patient stated he has a history of dribbling, bladder prolapse, patient stated she  
does not really catheter. spoke to dr odell, per dr odell if there is no bladder distension or 
pain, just continue to monitor the patient. discontinue the order for denny catheter 
insertion, and discontinue bladder scan. will continue to monitor for any bladder discomfort 
or pain.

-------------------------------------------------------------------------------

Addendum: 07/05/20 at 1230 by GRIS LUNDBERG RN, RN

-------------------------------------------------------------------------------

patient urine noted with strong odor, dark color urine, patient also complained buring 
sensations, order obtained and sent to lab

-------------------------------------------------------------------------------

Addendum: 07/05/20 at 1251 by GRIS LUNDBERG RN RN

-------------------------------------------------------------------------------

spoke to [place] alex conservative in the morning, try to explain about patient care in 
detail, questions are answered, concerns addressed, spent with patient 45 minute in the 
morning explained care, bed bath given, cleansed well, incision site is clean and dry, well 
approximated, no signs and symptoms of infection noted.

## 2020-07-05 NOTE — NUR
bladder scan was done around 0843, result 659ml, however patient sat on bedside commode 
after and urinated, patient denied any bladder discomfort, or pain. continue to monitor, MD 
aware.

## 2020-07-05 NOTE — NUR
PATIENT STILL WITH NO EPISODE OF VOIDING. PATIENT HAD LARGE LOOSE BM. BLADDER SCAN DONE AND 
WAS 214ML.

## 2020-07-05 NOTE — NUR
started on atb for uti, first dose given, no adverse reaction noted, patient requested for 
sleeping medication, order obtained and noted, no distress noted, kept patient clean and 
dry.

## 2020-07-05 NOTE — NUR
PATIENT WITH EPISODE OF VOIDING, SCANT AMOUNT. BLADDER SCAN DONE WITH 618ML. INSERTED JOHNSON 
CATHETER 18F, 2 ATTEMPTS WITH NO SUCCESS. ENDORSED TO AM SHIFT NURSE.

## 2020-07-05 NOTE — NUR
PT REFUSED HER COLACE MEDICATION. PT IN NO ACUTE DISTRESS. SAFETY AND COMFORT PROVIDED. WILL 
CONTINUE TO MONITOR.

## 2020-07-05 NOTE — NUR
RECEIVED PT AWAKE, ALERT AND ORIENTEDX3. PT IN NO ACUTE DISTRESS. IV INTACT. SAFETY AND 
COMFORT PROVIDED. WILL CONTINUE TO MONITOR.

## 2020-07-06 VITALS — DIASTOLIC BLOOD PRESSURE: 62 MMHG | SYSTOLIC BLOOD PRESSURE: 142 MMHG

## 2020-07-06 VITALS — SYSTOLIC BLOOD PRESSURE: 125 MMHG | DIASTOLIC BLOOD PRESSURE: 64 MMHG

## 2020-07-06 VITALS — SYSTOLIC BLOOD PRESSURE: 122 MMHG | DIASTOLIC BLOOD PRESSURE: 65 MMHG

## 2020-07-06 VITALS — DIASTOLIC BLOOD PRESSURE: 57 MMHG | SYSTOLIC BLOOD PRESSURE: 141 MMHG

## 2020-07-06 RX ADMIN — DOCUSATE SODIUM SCH MG: 100 CAPSULE, LIQUID FILLED ORAL at 08:01

## 2020-07-06 RX ADMIN — SULFAMETHOXAZOLE AND TRIMETHOPRIM SCH TAB: 800; 160 TABLET ORAL at 16:18

## 2020-07-06 RX ADMIN — Medication SCH MG: at 08:01

## 2020-07-06 RX ADMIN — TEMAZEPAM PRN MG: 15 CAPSULE ORAL at 22:17

## 2020-07-06 RX ADMIN — DILTIAZEM HYDROCHLORIDE SCH MG: 180 CAPSULE, EXTENDED RELEASE ORAL at 08:01

## 2020-07-06 RX ADMIN — LACTOBACILLUS ACIDOPH-L.BULGARICUS 1 MILLION CELL CHEWABLE TABLET SCH TAB.CHEW: at 16:18

## 2020-07-06 RX ADMIN — ENOXAPARIN SODIUM SCH MG: 30 INJECTION SUBCUTANEOUS at 08:03

## 2020-07-06 RX ADMIN — CELECOXIB SCH MG: 100 CAPSULE ORAL at 16:18

## 2020-07-06 RX ADMIN — Medication SCH MG: at 13:01

## 2020-07-06 RX ADMIN — THERA TABS SCH UDTAB: TAB at 08:01

## 2020-07-06 RX ADMIN — Medication SCH MG: at 16:18

## 2020-07-06 RX ADMIN — POLYETHYLENE GLYCOL 3350 SCH GM: 17 POWDER, FOR SOLUTION ORAL at 08:01

## 2020-07-06 RX ADMIN — LACTOBACILLUS ACIDOPH-L.BULGARICUS 1 MILLION CELL CHEWABLE TABLET SCH TAB.CHEW: at 08:01

## 2020-07-06 RX ADMIN — SULFAMETHOXAZOLE AND TRIMETHOPRIM SCH TAB: 800; 160 TABLET ORAL at 08:01

## 2020-07-06 RX ADMIN — CELECOXIB SCH MG: 100 CAPSULE ORAL at 08:02

## 2020-07-06 RX ADMIN — PANTOPRAZOLE SODIUM SCH MG: 40 TABLET, DELAYED RELEASE ORAL at 06:09

## 2020-07-06 RX ADMIN — DOCUSATE SODIUM SCH MG: 100 CAPSULE, LIQUID FILLED ORAL at 20:52

## 2020-07-06 NOTE — NUR
Received patient in bed. AAO x3. No acute distress or SOB was noted. On room air. No 
Complain of pain at this time. Physical assessment done. IV line in left FA G 20, no sign of 
inflammation. Safety measures maintained, fall prevention observed. Skin assessed. Bed in 
locked and low position, side rails up x2 for safety, bed alarm on. Call light and 
frequently using items within reach. Continue to monitor.

## 2020-07-06 NOTE — NUR
Patient refused Colace 100 mg cap, stated, "I take this med at morning at home and I am not 
taking it at night". Risks and benefits explained, still refused. Will endorse to the day 
shift nurse for possible change of the medication schedule.

## 2020-07-06 NOTE — NUR
AT 0055H PT GIVEN RESTORIL AS PER PT REQUEST. PT IN NO ACUTE DISTRESS. ZGUARD PASTE PUT ON 
THE BUTTOCKS. ASSIST PT WHEN GOING TO COMMODE AND BACK TO BED. PT IN NO ACUTE DISTRESS. 
SAFETY AND COMFORT PROVIDED. HANDS OFF REPORT TO STEVE

## 2020-07-06 NOTE — NUR
AAox4. In no acute distress. Denies any pain or SOB. IV site on left FA intact and patent. 
Staples on right hip area intact and patent. Needs assessed and attended to. Safety measure 
maintained and call bell within reached.

## 2020-07-07 VITALS — DIASTOLIC BLOOD PRESSURE: 56 MMHG | SYSTOLIC BLOOD PRESSURE: 127 MMHG

## 2020-07-07 VITALS — SYSTOLIC BLOOD PRESSURE: 119 MMHG | DIASTOLIC BLOOD PRESSURE: 48 MMHG

## 2020-07-07 VITALS — DIASTOLIC BLOOD PRESSURE: 64 MMHG | SYSTOLIC BLOOD PRESSURE: 178 MMHG

## 2020-07-07 RX ADMIN — Medication SCH MG: at 08:27

## 2020-07-07 RX ADMIN — ENOXAPARIN SODIUM SCH MG: 30 INJECTION SUBCUTANEOUS at 08:29

## 2020-07-07 RX ADMIN — DOCUSATE SODIUM SCH MG: 100 CAPSULE, LIQUID FILLED ORAL at 20:17

## 2020-07-07 RX ADMIN — Medication SCH MG: at 17:19

## 2020-07-07 RX ADMIN — LACTOBACILLUS ACIDOPH-L.BULGARICUS 1 MILLION CELL CHEWABLE TABLET SCH TAB.CHEW: at 17:19

## 2020-07-07 RX ADMIN — LACTOBACILLUS ACIDOPH-L.BULGARICUS 1 MILLION CELL CHEWABLE TABLET SCH TAB.CHEW: at 08:26

## 2020-07-07 RX ADMIN — THERA TABS SCH UDTAB: TAB at 08:26

## 2020-07-07 RX ADMIN — CELECOXIB SCH MG: 100 CAPSULE ORAL at 08:23

## 2020-07-07 RX ADMIN — DOCUSATE SODIUM SCH MG: 100 CAPSULE, LIQUID FILLED ORAL at 08:26

## 2020-07-07 RX ADMIN — Medication SCH MG: at 13:29

## 2020-07-07 RX ADMIN — POLYETHYLENE GLYCOL 3350 SCH GM: 17 POWDER, FOR SOLUTION ORAL at 08:28

## 2020-07-07 RX ADMIN — CELECOXIB SCH MG: 100 CAPSULE ORAL at 17:19

## 2020-07-07 RX ADMIN — PANTOPRAZOLE SODIUM SCH MG: 40 TABLET, DELAYED RELEASE ORAL at 06:15

## 2020-07-07 RX ADMIN — SULFAMETHOXAZOLE AND TRIMETHOPRIM SCH TAB: 800; 160 TABLET ORAL at 17:19

## 2020-07-07 RX ADMIN — SULFAMETHOXAZOLE AND TRIMETHOPRIM SCH TAB: 800; 160 TABLET ORAL at 08:27

## 2020-07-07 RX ADMIN — POLYETHYLENE GLYCOL 3350 SCH GM: 17 POWDER, FOR SOLUTION ORAL at 08:35

## 2020-07-07 RX ADMIN — DILTIAZEM HYDROCHLORIDE SCH MG: 180 CAPSULE, EXTENDED RELEASE ORAL at 08:26

## 2020-07-07 RX ADMIN — TEMAZEPAM PRN MG: 15 CAPSULE ORAL at 23:19

## 2020-07-07 NOTE — NUR
Received patient awake in bed in stable condition, watching TV. Axox4 no complaint of 
pain/discomfort noted. No c/o abdominal pain/discomfort noted. Due medication administered. 
Pictures right hip surgical site and right abdominal incision taken and placed in chart. 
Assisted patient on commode multiple times. safety measure in place, call light and all 
personal items within patient reach. all needs attended to. Will continue monitor.

## 2020-07-07 NOTE — NUR
Patient tolerated therapy well without complaint of pain/discomfort. Patient S/P wound dry, 
less redness noted. Continue open to air. will continue monitor

## 2020-07-07 NOTE — NUR
Received patient awake in bed in stable condition. no complaint of pain/discomfort noted. 
Resident participates in therapy this morning. Resident for removal of sutures on July 9 as 
per SX order. Resident ongoing bactrim x 7days for UTI. no c/o abdominal pain/discomfort 
noted. Consumed breakfast fairly. will continue monitor

## 2020-07-08 VITALS — DIASTOLIC BLOOD PRESSURE: 60 MMHG | SYSTOLIC BLOOD PRESSURE: 127 MMHG

## 2020-07-08 VITALS — DIASTOLIC BLOOD PRESSURE: 66 MMHG | SYSTOLIC BLOOD PRESSURE: 105 MMHG

## 2020-07-08 VITALS — DIASTOLIC BLOOD PRESSURE: 51 MMHG | SYSTOLIC BLOOD PRESSURE: 125 MMHG

## 2020-07-08 VITALS — DIASTOLIC BLOOD PRESSURE: 68 MMHG | SYSTOLIC BLOOD PRESSURE: 148 MMHG

## 2020-07-08 RX ADMIN — ENOXAPARIN SODIUM SCH MG: 30 INJECTION SUBCUTANEOUS at 08:40

## 2020-07-08 RX ADMIN — Medication SCH MG: at 08:37

## 2020-07-08 RX ADMIN — CELECOXIB SCH MG: 100 CAPSULE ORAL at 17:30

## 2020-07-08 RX ADMIN — SULFAMETHOXAZOLE AND TRIMETHOPRIM SCH TAB: 800; 160 TABLET ORAL at 08:37

## 2020-07-08 RX ADMIN — LACTOBACILLUS ACIDOPH-L.BULGARICUS 1 MILLION CELL CHEWABLE TABLET SCH TAB.CHEW: at 08:37

## 2020-07-08 RX ADMIN — Medication SCH MG: at 17:30

## 2020-07-08 RX ADMIN — Medication SCH MG: at 13:31

## 2020-07-08 RX ADMIN — THERA TABS SCH UDTAB: TAB at 08:37

## 2020-07-08 RX ADMIN — DOCUSATE SODIUM SCH MG: 100 CAPSULE, LIQUID FILLED ORAL at 20:40

## 2020-07-08 RX ADMIN — PANTOPRAZOLE SODIUM SCH MG: 40 TABLET, DELAYED RELEASE ORAL at 06:02

## 2020-07-08 RX ADMIN — CELECOXIB SCH MG: 100 CAPSULE ORAL at 08:37

## 2020-07-08 RX ADMIN — TEMAZEPAM PRN MG: 15 CAPSULE ORAL at 22:53

## 2020-07-08 RX ADMIN — DILTIAZEM HYDROCHLORIDE SCH MG: 180 CAPSULE, EXTENDED RELEASE ORAL at 08:37

## 2020-07-08 RX ADMIN — LACTOBACILLUS ACIDOPH-L.BULGARICUS 1 MILLION CELL CHEWABLE TABLET SCH TAB.CHEW: at 17:30

## 2020-07-08 RX ADMIN — DOCUSATE SODIUM SCH MG: 100 CAPSULE, LIQUID FILLED ORAL at 08:41

## 2020-07-08 RX ADMIN — SULFAMETHOXAZOLE AND TRIMETHOPRIM SCH TAB: 800; 160 TABLET ORAL at 17:30

## 2020-07-08 RX ADMIN — POLYETHYLENE GLYCOL 3350 SCH GM: 17 POWDER, FOR SOLUTION ORAL at 08:38

## 2020-07-08 NOTE — NUR
Received patient in bed. AAO x3. No acute distress or SOB was noted. On room air. No 
Complain of pain at this time. Physical assessment done. Safety measures maintained, fall 
prevention observed. Skin assessed. Bed in locked and low position, side rails up x2 for 
safety, bed alarm on. Call light and frequently using items within reach. Continue to 
monitor.

## 2020-07-08 NOTE — NUR
Patient  slept well, requested Restoril, administered as requested. Vital signs WNL. Needs 
attended to promptly. assisted patient onto the commode multiple times through the night.No 
complaints presented during shift. 

Fall precautions and safety measure maintained. Call bell within reach.

## 2020-07-09 VITALS — DIASTOLIC BLOOD PRESSURE: 59 MMHG | SYSTOLIC BLOOD PRESSURE: 125 MMHG

## 2020-07-09 VITALS — DIASTOLIC BLOOD PRESSURE: 64 MMHG | SYSTOLIC BLOOD PRESSURE: 135 MMHG

## 2020-07-09 VITALS — SYSTOLIC BLOOD PRESSURE: 117 MMHG | DIASTOLIC BLOOD PRESSURE: 60 MMHG

## 2020-07-09 VITALS — DIASTOLIC BLOOD PRESSURE: 86 MMHG | SYSTOLIC BLOOD PRESSURE: 122 MMHG

## 2020-07-09 RX ADMIN — Medication SCH MG: at 17:16

## 2020-07-09 RX ADMIN — Medication SCH MG: at 09:00

## 2020-07-09 RX ADMIN — SULFAMETHOXAZOLE AND TRIMETHOPRIM SCH TAB: 800; 160 TABLET ORAL at 09:00

## 2020-07-09 RX ADMIN — LACTOBACILLUS ACIDOPH-L.BULGARICUS 1 MILLION CELL CHEWABLE TABLET SCH TAB.CHEW: at 17:16

## 2020-07-09 RX ADMIN — LACTOBACILLUS ACIDOPH-L.BULGARICUS 1 MILLION CELL CHEWABLE TABLET SCH TAB.CHEW: at 09:00

## 2020-07-09 RX ADMIN — DILTIAZEM HYDROCHLORIDE SCH MG: 180 CAPSULE, EXTENDED RELEASE ORAL at 09:01

## 2020-07-09 RX ADMIN — DOCUSATE SODIUM SCH MG: 100 CAPSULE, LIQUID FILLED ORAL at 22:00

## 2020-07-09 RX ADMIN — Medication SCH MG: at 12:57

## 2020-07-09 RX ADMIN — POLYETHYLENE GLYCOL 3350 SCH GM: 17 POWDER, FOR SOLUTION ORAL at 09:00

## 2020-07-09 RX ADMIN — THERA TABS SCH UDTAB: TAB at 09:00

## 2020-07-09 RX ADMIN — CELECOXIB SCH MG: 100 CAPSULE ORAL at 09:00

## 2020-07-09 RX ADMIN — PANTOPRAZOLE SODIUM SCH MG: 40 TABLET, DELAYED RELEASE ORAL at 06:31

## 2020-07-09 RX ADMIN — TEMAZEPAM PRN MG: 15 CAPSULE ORAL at 22:34

## 2020-07-09 RX ADMIN — SULFAMETHOXAZOLE AND TRIMETHOPRIM SCH TAB: 800; 160 TABLET ORAL at 17:16

## 2020-07-09 RX ADMIN — CELECOXIB SCH MG: 100 CAPSULE ORAL at 17:16

## 2020-07-09 RX ADMIN — DOCUSATE SODIUM SCH MG: 100 CAPSULE, LIQUID FILLED ORAL at 09:00

## 2020-07-09 RX ADMIN — ENOXAPARIN SODIUM SCH MG: 30 INJECTION SUBCUTANEOUS at 09:02

## 2020-07-09 NOTE — NUR
Patient remains alert, oriented x 3, not in any form of distress on room air. Due 
medications administered and tolerated. Patient refused colace  and miralax due to episode 
of liquid BM. Needs attended to promptly and met. Surgical wound is well coaptated,  no 
signs of infection, surgical staples removed as ordered by surgeon applied steri-strips and 
covered with dressing kept clean and dry. Patient denies any pain or discomfort at this 
time. Patient participated with PT and OT and tolerated. Call light and frequently used 
items placed within patient's reach. Safety measures maintained.

## 2020-07-10 VITALS — DIASTOLIC BLOOD PRESSURE: 90 MMHG | SYSTOLIC BLOOD PRESSURE: 135 MMHG

## 2020-07-10 VITALS — SYSTOLIC BLOOD PRESSURE: 131 MMHG | DIASTOLIC BLOOD PRESSURE: 74 MMHG

## 2020-07-10 RX ADMIN — ENOXAPARIN SODIUM SCH MG: 30 INJECTION SUBCUTANEOUS at 08:47

## 2020-07-10 RX ADMIN — DILTIAZEM HYDROCHLORIDE SCH MG: 180 CAPSULE, EXTENDED RELEASE ORAL at 08:45

## 2020-07-10 RX ADMIN — POLYETHYLENE GLYCOL 3350 SCH GM: 17 POWDER, FOR SOLUTION ORAL at 08:50

## 2020-07-10 RX ADMIN — Medication SCH MG: at 08:44

## 2020-07-10 RX ADMIN — POLYETHYLENE GLYCOL 3350 SCH GM: 17 POWDER, FOR SOLUTION ORAL at 08:45

## 2020-07-10 RX ADMIN — PANTOPRAZOLE SODIUM SCH MG: 40 TABLET, DELAYED RELEASE ORAL at 06:22

## 2020-07-10 RX ADMIN — THERA TABS SCH UDTAB: TAB at 08:46

## 2020-07-10 RX ADMIN — LACTOBACILLUS ACIDOPH-L.BULGARICUS 1 MILLION CELL CHEWABLE TABLET SCH TAB.CHEW: at 08:44

## 2020-07-10 RX ADMIN — SULFAMETHOXAZOLE AND TRIMETHOPRIM SCH TAB: 800; 160 TABLET ORAL at 08:44

## 2020-07-10 RX ADMIN — DOCUSATE SODIUM SCH MG: 100 CAPSULE, LIQUID FILLED ORAL at 08:48

## 2020-07-10 RX ADMIN — Medication SCH MG: at 13:00

## 2020-07-10 RX ADMIN — CELECOXIB SCH MG: 100 CAPSULE ORAL at 08:45

## 2020-07-10 NOTE — NUR
SHE JUST CALLED AND STATED SHE WILL NOT BE RETURNING AFTER HER APPT AS PLANNED. SHE STATES 
SHE WILL BE HER AT 2000 TO  HER BELONGINGS. SHE HAS GONE AMA

## 2020-07-10 NOTE — NUR
PATIENT ALERT ORIENTED, NO COMPLAIN OF PAIN AT THIS TIME, PATIENT CONTINENT OF BOWEL AND 
BLADDER, ASSISTED TO THE COMMODE.  PATIENT R HIP STERI STRIP INTACT. PATIENT TOOK SLEEPING 
PILL WITH EFFECTIVE RESULTS, PATIENT REFUSED COLACE, SAID SHE HAD BM EVERYDAY. CONT TO 
MONITOR.

## 2022-11-01 ENCOUNTER — OFFICE (OUTPATIENT)
Dept: URBAN - METROPOLITAN AREA CLINIC 45 | Facility: CLINIC | Age: 77
End: 2022-11-01

## 2022-11-01 VITALS
HEIGHT: 66 IN | DIASTOLIC BLOOD PRESSURE: 77 MMHG | SYSTOLIC BLOOD PRESSURE: 132 MMHG | WEIGHT: 217 LBS | HEART RATE: 76 BPM | TEMPERATURE: 98.4 F

## 2022-11-01 DIAGNOSIS — C55 UTERINE CANCER: ICD-10-CM

## 2022-11-01 DIAGNOSIS — K59.00 CONSTIPATION: ICD-10-CM

## 2022-11-01 DIAGNOSIS — Z12.11 SCREENING FOR COLON CANCER: ICD-10-CM

## 2022-11-01 PROCEDURE — 99203 OFFICE O/P NEW LOW 30 MIN: CPT | Performed by: INTERNAL MEDICINE

## 2022-11-01 NOTE — SERVICEHPINOTES
Patient presents for a screening colonoscopy. There has been no previous colon screening since 2010. The patient has no family history of colon cancer or other significant GI diseases. She had uterine cancer with surgery and radiation in 2021.She has some constipation managed mostly by diet. Patient denies fever, nausea, vomiting, dysphagia, reflux, abdominal pain, change in bowel habits, diarrhea, rectal bleeding, melena, and significant change in weight. Denies shortness of breath and chest pain.

## 2023-12-03 NOTE — NUR
Patient requested to remove IV. There was not any IV medication. IV line removed. 
Intervention updated. Will endorse to oncoming nurse. Patient/Caregiver provided printed discharge information.